# Patient Record
Sex: MALE | Race: WHITE | NOT HISPANIC OR LATINO | Employment: OTHER | URBAN - METROPOLITAN AREA
[De-identification: names, ages, dates, MRNs, and addresses within clinical notes are randomized per-mention and may not be internally consistent; named-entity substitution may affect disease eponyms.]

---

## 2024-04-24 ENCOUNTER — HOSPITAL ENCOUNTER (OUTPATIENT)
Dept: RADIOLOGY | Facility: EXTERNAL LOCATION | Age: 29
Discharge: HOME | End: 2024-04-24
Payer: COMMERCIAL

## 2024-04-24 NOTE — PROGRESS NOTES
It was a pleasure to see Mr. Matamoros at the Neurosurgery Spine Clinic at [facility].     He is a really nice 28 y.o. male  who presents to us with complaints LOW BACK PAIN radiating to RIGHT LEG that started about  3  years  ago, and have been gradually worsening since that time.  The symptoms started after no known injury    The pain is 9 /10. The pain is described as aching, burning, and stabbing and occurs all day.  Symptoms are exacerbated by standing and walking . Factors which relieve the pain include  lying down       Numbness and/or tingling - YES    Weakness : YES    Bladder/Bowel symptoms - NO    The patient has tried medications (eg: NSAIDS : Yes  PT : Yes    Date: last was 1.5 months ago  Pain Management with ESIs/selective nerve blocks  - YES    he is a NON-SMOKER and NON-DIABETIC    History of Depression : NO    PRIOR SPINE SURGERY: NO    Denies use of Aspirin, Coumadin, or Plavix or any other anticoagulants     NARCOTICS for pain : NO    Part of this patient’s history is from personal review of the patient’s previous charts.      No past medical history on file.      No current outpatient medications on file.      Review of Systems :   Constitutional: No fever or chills  Respiratory: No shortness of breath or wheezing  Musculoskeletal: see HPI above   Neurologic: See HPI above      EXAM:   There were no vitals filed for this visit.  NEURO: Neurologically patient is awake alert and oriented X 3    No obvious cranial nerve deficit.  Strength is almost 5/5 throughout all motor groups tested with no asymmetric significant motor deficit.  Deep tendon reflexes are symmetric throughout.   Gait : Antalgic.  There is obvious evidence of left-sided tilt while standing and walking.  Sensory examination is intact to touch and painful stimuli.      IMAGING:   MRI of the lumbar spine done in July 2022 and February 2024 was reviewed personally and shows evidence of left-sided scoliosis secondary to  segmentation anomaly involving L2 vertebral body with varying degree of lateral recess stenosis especially on the right side and central canal stenosis but no evidence of any high-grade compression.  AP and lateral x-rays of the lumbar spine done in thousand 22 were also reviewed and shows evidence of left-sided scoliosis secondary to segmentation anomaly involving the lumbar vertebra measuring about 30 degree decrease from L1-L5.  There is clear evidence of pelvic obliquity with the left side lower than the right measuring about 1 cm or so.    ASSESSMENT AND PLAN:  Ildefonso Matamoros is a 28 y.o. male who was diagnosed with congenital scoliosis right from birth and has noted over the past 2 or 3 years a significant pain involving the low back right lower extremity especially involving the thigh region and left-sided in the paraspinal region.  He has tried physical therapy and pain management in the past with no significant relief whatsoever.  He also feels that he walks with a left-sided tilt and has leg length discrepancy for which she veers a shoe lift on the left measuring about 7 mm or so.  I discussed with the patient and his family who was with him today the options of treatment at this point would consist of continued nonoperative treatment versus surgery if x-rays that I ordered today would show any further worsening or even otherwise given the fact that he has been significantly symptomatic.  Discussed with him the pros and cons of surgery that would consist of major thoracolumbar instrumentation and fusion for correction of the scoliosis and decompression of the nerve roots.  At this point I have ordered EOS films with leg lift on the left to evaluate his scoliosis along with a CT scan of the lumbar spine to evaluate the exact bony anatomy given presence of vertebral segmentation defect and I will get in touch with after they are completed to see if he would benefit from any kind of surgical  intervention or we can manage these with continued nonoperative treatment.    It was a pleasure to participate in Mr. Matamoros clinical care. All questions were answered to him satisfaction and he explained understanding of the further treatment plan.     Robert Cevallos MD, [facility]   of Neurological Surgery  [facility]  Attending Surgeon  Director - Minimally Invasive Spine Surgery  [place], [place]      ---Some of this note was completed using Dragon voice recognition technology and sometimes the software misinterprets words. This may include unintended errors with respect to translation of words, typographical errors or grammar errors which may not have been identified prior to finalization of the chart note. Please take this into account when reading this note---      Robert Cevallos MD, [facility]   of Neurological Surgery  [facility]  Attending Surgeon  Director - Minimally Invasive Spine Surgery  [place], [place]      Some of this note was completed using Dragon voice recognition technology and sometimes the software misinterprets words. This may include unintended errors with respect to translation of words, typographical errors or grammar errors which may not have been identified prior to finalization of the chart note. Please take this into account when reading this note

## 2024-05-02 ENCOUNTER — OFFICE VISIT (OUTPATIENT)
Dept: NEUROSURGERY | Facility: CLINIC | Age: 29
End: 2024-05-02
Payer: COMMERCIAL

## 2024-05-02 VITALS
DIASTOLIC BLOOD PRESSURE: 84 MMHG | WEIGHT: 250 LBS | SYSTOLIC BLOOD PRESSURE: 165 MMHG | HEIGHT: 71 IN | RESPIRATION RATE: 20 BRPM | HEART RATE: 112 BPM | BODY MASS INDEX: 35 KG/M2

## 2024-05-02 DIAGNOSIS — Q67.5 SCOLIOSIS, CONGENITAL: ICD-10-CM

## 2024-05-02 DIAGNOSIS — M54.16 LUMBAR RADICULOPATHY, CHRONIC: Primary | ICD-10-CM

## 2024-05-02 PROCEDURE — 1036F TOBACCO NON-USER: CPT | Performed by: NEUROLOGICAL SURGERY

## 2024-05-02 PROCEDURE — 99204 OFFICE O/P NEW MOD 45 MIN: CPT | Performed by: NEUROLOGICAL SURGERY

## 2024-05-02 PROCEDURE — 99214 OFFICE O/P EST MOD 30 MIN: CPT | Performed by: NEUROLOGICAL SURGERY

## 2024-05-02 ASSESSMENT — PAIN SCALES - GENERAL: PAINLEVEL: 6

## 2024-05-28 ENCOUNTER — HOSPITAL ENCOUNTER (OUTPATIENT)
Dept: RADIOLOGY | Facility: HOSPITAL | Age: 29
Discharge: HOME | End: 2024-05-28
Payer: COMMERCIAL

## 2024-05-28 DIAGNOSIS — Q67.5 SCOLIOSIS, CONGENITAL: ICD-10-CM

## 2024-05-28 PROCEDURE — 72131 CT LUMBAR SPINE W/O DYE: CPT | Performed by: RADIOLOGY

## 2024-05-28 PROCEDURE — 72131 CT LUMBAR SPINE W/O DYE: CPT

## 2024-05-29 ENCOUNTER — HOSPITAL ENCOUNTER (OUTPATIENT)
Dept: RADIOLOGY | Facility: HOSPITAL | Age: 29
Discharge: HOME | End: 2024-05-29
Payer: COMMERCIAL

## 2024-05-29 DIAGNOSIS — Q67.5 SCOLIOSIS, CONGENITAL: ICD-10-CM

## 2024-05-29 PROCEDURE — 72082 X-RAY EXAM ENTIRE SPI 2/3 VW: CPT

## 2024-05-29 PROCEDURE — 72082 X-RAY EXAM ENTIRE SPI 2/3 VW: CPT | Performed by: RADIOLOGY

## 2024-06-03 NOTE — PROGRESS NOTES
It was a pleasure to see Mr. Matamoros at the Neurosurgery Spine Clinic at [facility]. He is a really nice 28 y.o. male  who presents to us with complaints LOW BACK PAIN radiating to RIGHT LEG that started about  3  years  ago, and have been gradually worsening since that time.  Last visit I asked him to hae new imaging.  He has completed those imaging and is seeing me today to discuss further treatment options.  This is televisit with both audio and video appointment.  The patient mentions that he continues to have significant back pain with some lower extremity pain mostly mainly along the thighs.  He denies any bowel symptoms or any new weakness.    Constitutional: No fever or chills  Respiratory: No shortness of breath or wheezing  Musculoskeletal: see HPI above   Neurologic: See HPI above    NEURO: Neurologically patient is awake alert and oriented X 3    No obvious cranial nerve deficit.  Patient is able to move both upper and lower extremities without any focal deficit.  Essentially strength seems to be within normal limits.      EOS scoliosis films were reviewed personally and shows evidence of a well aligned spine globally but there is evidence of left-sided coronal deformity involving the lumbar spine secondary to L3 vertebral body segmentation defect with about 41 degrees of Saez angle measured from L1-L4.  CT scan shows evidence of her scoliosis to the left with segmentation defect involving the L3 vertebral body.  MRI of the lumbar spine done in July 2022 and February 2024 was reviewed personally and shows evidence of left-sided scoliosis secondary to segmentation anomaly involving L2 vertebral body with varying degree of lateral recess stenosis especially on the right side and central canal stenosis but no evidence of any high-grade compression     PLAN:    Mr. Matamoros is a really nice 28 y.o. 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  His major symptoms remains back pain but he does have some element of lower extremity pain suggestive of radiculopathy.  I discussed all the imagings with him and explained to him that I do not think he would qualify for any kind of focal decompressive surgery given the presence of significant scoliosis and at this point his option would be either agreed to continued nonoperative treatment with physical therapy and pain management versus consider definitive surgery which will consist of a major thoracolumbar instrumented fusion given the degree of scoliosis.  Discussed briefly with him the pros and cons of surgical treatment and at this point of time shared decision was made to consider nonoperative treatment.  I given a referral for physical therapy and pain management.  If he fails to improve with that or if he wants to proceed with surgery he can call my office and I will be more than happy to consider that.  At this point of time we will get her scoliosis treatment 1 year from now to see if there is any progression of the scoliosis in which case also he would qualify for surgical treatment.    It was a pleasure to participate in Mr. Matamoros clinical care. All questions were answered to him satisfaction and he explained understanding of the further treatment plan.     Robert Cevallos MD, [facility]   of Neurological Surgery  [facility] [employer]  Attending Surgeon  Director - Minimally Invasive Spine Surgery  [place], [place]      ---Some of this note was completed using Dragon voice recognition technology and sometimes the software misinterprets words. This may include unintended errors with respect to translation of words, typographical errors or grammar errors which may not have been identified prior to  finalization of the chart note. Please take this into account when reading this note---

## 2024-06-06 ENCOUNTER — TELEMEDICINE (OUTPATIENT)
Dept: NEUROSURGERY | Facility: CLINIC | Age: 29
End: 2024-06-06
Payer: COMMERCIAL

## 2024-06-06 DIAGNOSIS — M54.16 LUMBAR RADICULOPATHY, CHRONIC: ICD-10-CM

## 2024-06-06 DIAGNOSIS — Q67.5 SCOLIOSIS, CONGENITAL: ICD-10-CM

## 2024-06-06 PROCEDURE — 99214 OFFICE O/P EST MOD 30 MIN: CPT | Performed by: NEUROLOGICAL SURGERY

## 2024-06-06 PROCEDURE — 99214 OFFICE O/P EST MOD 30 MIN: CPT | Mod: 95 | Performed by: NEUROLOGICAL SURGERY

## 2024-06-10 ENCOUNTER — APPOINTMENT (OUTPATIENT)
Dept: PAIN MEDICINE | Facility: CLINIC | Age: 29
End: 2024-06-10
Payer: COMMERCIAL

## 2024-06-10 ENCOUNTER — TELEPHONE (OUTPATIENT)
Dept: PAIN MEDICINE | Facility: CLINIC | Age: 29
End: 2024-06-10
Payer: COMMERCIAL

## 2024-06-19 ENCOUNTER — OFFICE VISIT (OUTPATIENT)
Dept: PAIN MEDICINE | Facility: CLINIC | Age: 29
End: 2024-06-19
Payer: COMMERCIAL

## 2024-06-19 ENCOUNTER — TELEPHONE (OUTPATIENT)
Dept: PAIN MEDICINE | Facility: CLINIC | Age: 29
End: 2024-06-19
Payer: COMMERCIAL

## 2024-06-19 VITALS
SYSTOLIC BLOOD PRESSURE: 147 MMHG | WEIGHT: 248 LBS | BODY MASS INDEX: 34.59 KG/M2 | RESPIRATION RATE: 16 BRPM | HEART RATE: 109 BPM | DIASTOLIC BLOOD PRESSURE: 88 MMHG

## 2024-06-19 DIAGNOSIS — M47.816 LUMBAR SPONDYLOSIS: ICD-10-CM

## 2024-06-19 DIAGNOSIS — M48.062 NEUROGENIC CLAUDICATION DUE TO LUMBAR SPINAL STENOSIS: Primary | ICD-10-CM

## 2024-06-19 DIAGNOSIS — M79.18 MYOFASCIAL PAIN: ICD-10-CM

## 2024-06-19 PROCEDURE — 99213 OFFICE O/P EST LOW 20 MIN: CPT | Performed by: STUDENT IN AN ORGANIZED HEALTH CARE EDUCATION/TRAINING PROGRAM

## 2024-06-19 RX ORDER — BACLOFEN 10 MG/1
10 TABLET ORAL 3 TIMES DAILY
Qty: 90 TABLET | Refills: 0 | Status: SHIPPED | OUTPATIENT
Start: 2024-06-19 | End: 2024-07-19

## 2024-06-19 RX ORDER — SODIUM CHLORIDE, SODIUM LACTATE, POTASSIUM CHLORIDE, CALCIUM CHLORIDE 600; 310; 30; 20 MG/100ML; MG/100ML; MG/100ML; MG/100ML
20 INJECTION, SOLUTION INTRAVENOUS CONTINUOUS
OUTPATIENT
Start: 2024-06-19

## 2024-06-19 RX ORDER — GABAPENTIN 300 MG/1
600 CAPSULE ORAL 3 TIMES DAILY
Qty: 180 CAPSULE | Refills: 0 | Status: SHIPPED | OUTPATIENT
Start: 2024-06-19 | End: 2024-07-19

## 2024-06-19 ASSESSMENT — COLUMBIA-SUICIDE SEVERITY RATING SCALE - C-SSRS
6. HAVE YOU EVER DONE ANYTHING, STARTED TO DO ANYTHING, OR PREPARED TO DO ANYTHING TO END YOUR LIFE?: NO
2. HAVE YOU ACTUALLY HAD ANY THOUGHTS OF KILLING YOURSELF?: NO
1. IN THE PAST MONTH, HAVE YOU WISHED YOU WERE DEAD OR WISHED YOU COULD GO TO SLEEP AND NOT WAKE UP?: NO

## 2024-06-19 ASSESSMENT — PATIENT HEALTH QUESTIONNAIRE - PHQ9
SUM OF ALL RESPONSES TO PHQ9 QUESTIONS 1 AND 2: 2
10. IF YOU CHECKED OFF ANY PROBLEMS, HOW DIFFICULT HAVE THESE PROBLEMS MADE IT FOR YOU TO DO YOUR WORK, TAKE CARE OF THINGS AT HOME, OR GET ALONG WITH OTHER PEOPLE: NOT DIFFICULT AT ALL
1. LITTLE INTEREST OR PLEASURE IN DOING THINGS: SEVERAL DAYS
2. FEELING DOWN, DEPRESSED OR HOPELESS: SEVERAL DAYS

## 2024-06-19 NOTE — PROGRESS NOTES
Subjective   Patient ID: Ildefonso Matamoros is a 28 y.o. male who presents for Back Pain (NPV here for evaluation of bilat lower back pain that radiates into bilat hips and legs rt side goes down to his foot and lt side stops at his knee rates pain score 5/10 now and 9/10 at worst with any activity. The pain started a year ago became worse 10/2023 he saw [facility] Dr. Rinku Sy had DUARTE, TPI lumbar, oral steroids, currently on Meloxicam, doing PT at [facility] 2x week , chiropractor 2x a week and had no relief in pain. ) FELISA score   30%, SOAPP score  1.  Scre[place] N/A for age, depression positive local resource list provided and msg sent to PCP and smoking negative     HPI  Patient has a history of lumbar stenosis with neurogenic claudication as well as lumbar spondyloarthropathy and lumbar scoliosis.  He has seen a spine surgeon in the past and discussed surgical options but would like to try conservative therapies from pain management before proceeding with surgery due to his age as well as extensive nature of the possible surgeries planned.  We did discuss his lumbar MRI and discussed that he has multilevel degenerative changes as well as elements of central canal and foraminal stenosis throughout.  He also has prominent epidural lipomatosis confounding his symptoms.  He rates his pain as a 0/10 when he is laying down but can be a 9/10 particularly with any standing or walking.  Standing or walking exacerbates his back pain as well as bilateral lower extremity symptoms where they feel like they are number falling asleep and tired/heavy.  He has done physical therapy as well as chiropractic care without relief, oral steroids as well as trigger point injections and singular epidural was done without relief.  He has not tried any membrane stabilizing medications or neuropathic pain medications per his report.  Review of Systems   All other systems reviewed and are  negative.      Objective   Physical Exam  Constitutional: No acute distress, well appearing and well nourished. Patient appears stated age.  Eyes: Conjunctiva non-icteric and eye lids are without obvious rash or drooping. Pupils are symmetric.  Ears, Nose, Mouth, and Throat: External ears and nose appear to be without deformity or rash. No lesions or masses noted.  Hearing is grossly intact.  Neck: No JVD noted, tracheal position is midline.  Head and Face: Examination of the head and face revealed no abnormalities.  Respiratory: No gasping or shortness of breath noted, no use of accessory muscles noted.  Cardiovascular: Examination for edema is normal.   GI: Abdomen nontender to palpation.  Skin: No rashes or open lesions/ulcers identified on examined areas.    MSK:   No asymmetry or masses noted of the musculature.   Examination of the muscles/joints/bones show grossly normal range of motion unless noted below.    Neurologic:  Motor strength:   5/5 muscle strength of the upper extremities bilateral and equal.  5/5 muscle strength of the lower extremities bilaterally and equal.     Sensation:   Sensation intact to light touch in the bilateral upper and lower extremities.    Provocative tests: Negative Jai sign bilaterally, seated straight leg raise does not reproduce radicular symptoms bilaterally.    Psychiatric: Mood and affect are normal.      Assessment/Plan   Diagnoses and all orders for this visit:  Neurogenic claudication due to lumbar spinal stenosis  -     gabapentin (Neurontin) 300 mg capsule; Take 2 capsules (600 mg) by mouth 3 times a day.  -     baclofen (Lioresal) 10 mg tablet; Take 1 tablet (10 mg) by mouth 3 times a day.  -     Epidural Steroid Injection; Future  Lumbar spondylosis  -     gabapentin (Neurontin) 300 mg capsule; Take 2 capsules (600 mg) by mouth 3 times a day.  -     baclofen (Lioresal) 10 mg tablet; Take 1 tablet (10 mg) by mouth 3 times a day.  -     Epidural Steroid Injection;  Future  Myofascial pain  -     gabapentin (Neurontin) 300 mg capsule; Take 2 capsules (600 mg) by mouth 3 times a day.  -     baclofen (Lioresal) 10 mg tablet; Take 1 tablet (10 mg) by mouth 3 times a day.  -     Epidural Steroid Injection; Future  Other orders  -     NPO Diet Except: Sips with meds; Effective now; Standing  -     Height and weight; Standing  -     Insert and maintain peripheral IV; Standing  -     Saline lock IV; Standing  -     Type And Screen; Standing  -     lactated Ringer's infusion  -     Adult diet Regular; Standing  -     Vital Signs; Standing  -     Notify physician - Standard Parameters; Standing  -     Continue IV fluids ordered pre-procedure; Standing  -     Prior to Discharge O2 Weaning; Standing  -     Pulse oximetry, continuous; Standing  -     Discharge patient; Standing    The patient´s history, physical exam and personal review of imaging indicate diagnoses of the above items.    Plan description:  -We lengthy discussion about his current symptoms we discussed majority of his symptoms are coming from lumbar stenosis with neurogenic claudication we will plan to perform lumbar epidural for this  -We also discussed that he has a component of lumbar spondyloarthropathy and lumbar medial branch blocks to target the L4/5 and L5/S1 facet joints may be reasonable to consider in the future if his pain becomes more axial in nature only.  -Will start him on gabapentin as well as baclofen  -Will follow-up 1 month postinjection or sooner if any issues arise      Counseling:  The patient was counseled regarding diagnostic results, instructions for management, risk factor reductions, prognosis, patient and family education, impressions, risk and benefits of treatment options, as well as adherence to current treatment regimen. The importance of physical therapy/core strengthening was discussed in regard to an appropriate level for the patient to participate in currently, as well as progress as  able. Nicotine and alcohol use were reviewed and discussed as appropriate in relation to cessation and abstinence as indiciated, time spent for smoking cessation counseling when appropriate is 3-10 minutes. Appropriate use of opioid medications if prescribed as well as adherance and compliance to routine screening and avoidance of any medication that causes side effects in combination such as benzodiazepines. OARRS reviewed when indicated and naloxone offered if opioid medication prescribed.    All questions and concerns were addressed during clinical visit. Patient verbalized understanding and agreement with the current plan and counselling. The patient was invited to contact us back anytime with any questions or concerns and follow-up with us in the future.  Narrative & Impression   MRN: [id number]  Patient Name: KEKE LEE     STUDY:  MRI L-SPINE WO;  7/20/2022 7:32 am     INDICATION:  PAIN.     COMPARISON:  Radiograph 05/19/2022.     ACCESSION NUMBER(S):  [id number]     ORDERING CLINICIAN:  GEN MICHAEL     TECHNIQUE:  Sagittal T1, T2, STIR, axial T1 and T2 weighted images of the lumbar  spine were acquired. Coronal T1 and coronal T2 weighted images were  also performed.     FINDINGS:  For counting purposes the last fully segmented vertebral body is  labeled L5.     There is moderate levoconvexity of the lumbar spine centered at L2.  There is a left lateral calos vertebra at the L2 level.     There is mild right lateral wedging of the L1 and L3 vertebral bodies.     Alignment vertebral body heights and marrow signal pattern otherwise  within normal limits. Mild edema along the right lateral endplates of  T12 and L1. There is desiccated disc signal at T12-L1, L1-L2 L2-L3  and L3-L4. Narrowing of the right sided disc spaces at these levels.     Paraspinal soft tissues are unremarkable.     Evaluation by level:     T12-L1: No severe spinal canal or neural foraminal stenosis.     T12-L1: Right eccentric disc  bulge and facet arthrosis.  Mild-to-moderate spinal canal stenosis, narrowing of the right  subarticular recess mild right and no significant left neural  foraminal stenosis.     L1-L2: Disc bulge and facet arthrosis. Moderate spinal canal  stenosis, narrowing of the subarticular recess, mild to moderate  right and no significant left neural foraminal stenosis.     L2-L3: Disc bulge, facet arthrosis and prominent epidural fat results  in severe spinal canal stenosis. No significant neural foraminal  stenosis.     L4-L5: Disc bulge and facet arthrosis. No significant spinal canal  stenosis. Mild left and no significant right neural foraminal  stenosis.     L5-S1: No significant spinal canal or neural foraminal stenose     IMPRESSION:  There is moderate levoconvexity of the lumbar spine centered at L2.  There is a left lateral calos vertebra at the L2 level.     Levoconvexity and degenerative changes of the lumbar spine most  pronounced at L2-L3 with severe narrowing of the spinal canal. Please  see above for further details.          Lidia Rivera RN 06/19/24 9:26 AM

## 2024-06-19 NOTE — H&P (VIEW-ONLY)
Subjective   Patient ID: Ildefonso Matamoros is a 28 y.o. male who presents for Back Pain (NPV here for evaluation of bilat lower back pain that radiates into bilat hips and legs rt side goes down to his foot and lt side stops at his knee rates pain score 5/10 now and 9/10 at worst with any activity. The pain started a year ago became worse 10/2023 he saw [facility] Dr. Rinku Sy had DURATE, TPI lumbar, oral steroids, currently on Meloxicam, doing PT at [facility] 2x week , chiropractor 2x a week and had no relief in pain. ) FELISA score   30%, SOAPP score  1.  Screening [place] N/A for age, depression positive local resource list provided and msg sent to PCP and smoking negative     HPI  Patient has a history of lumbar stenosis with neurogenic claudication as well as lumbar spondyloarthropathy and lumbar scoliosis.  He has seen a spine surgeon in the past and discussed surgical options but would like to try conservative therapies from pain management before proceeding with surgery due to his age as well as extensive nature of the possible surgeries planned.  We did discuss his lumbar MRI and discussed that he has multilevel degenerative changes as well as elements of central canal and foraminal stenosis throughout.  He also has prominent epidural lipomatosis confounding his symptoms.  He rates his pain as a 0/10 when he is laying down but can be a 9/10 particularly with any standing or walking.  Standing or walking exacerbates his back pain as well as bilateral lower extremity symptoms where they feel like they are number falling asleep and tired/heavy.  He has done physical therapy as well as chiropractic care without relief, oral steroids as well as trigger point injections and singular epidural was done without relief.  He has not tried any membrane stabilizing medications or neuropathic pain medications per his report.  Review of Systems   All other systems reviewed and are  negative.      Objective   Physical Exam  Constitutional: No acute distress, well appearing and well nourished. Patient appears stated age.  Eyes: Conjunctiva non-icteric and eye lids are without obvious rash or drooping. Pupils are symmetric.  Ears, Nose, Mouth, and Throat: External ears and nose appear to be without deformity or rash. No lesions or masses noted.  Hearing is grossly intact.  Neck: No JVD noted, tracheal position is midline.  Head and Face: Examination of the head and face revealed no abnormalities.  Respiratory: No gasping or shortness of breath noted, no use of accessory muscles noted.  Cardiovascular: Examination for edema is normal.   GI: Abdomen nontender to palpation.  Skin: No rashes or open lesions/ulcers identified on examined areas.    MSK:   No asymmetry or masses noted of the musculature.   Examination of the muscles/joints/bones show grossly normal range of motion unless noted below.    Neurologic:  Motor strength:   5/5 muscle strength of the upper extremities bilateral and equal.  5/5 muscle strength of the lower extremities bilaterally and equal.     Sensation:   Sensation intact to light touch in the bilateral upper and lower extremities.    Provocative tests: Negative Jai sign bilaterally, seated straight leg raise does not reproduce radicular symptoms bilaterally.    Psychiatric: Mood and affect are normal.      Assessment/Plan   Diagnoses and all orders for this visit:  Neurogenic claudication due to lumbar spinal stenosis  -     gabapentin (Neurontin) 300 mg capsule; Take 2 capsules (600 mg) by mouth 3 times a day.  -     baclofen (Lioresal) 10 mg tablet; Take 1 tablet (10 mg) by mouth 3 times a day.  -     Epidural Steroid Injection; Future  Lumbar spondylosis  -     gabapentin (Neurontin) 300 mg capsule; Take 2 capsules (600 mg) by mouth 3 times a day.  -     baclofen (Lioresal) 10 mg tablet; Take 1 tablet (10 mg) by mouth 3 times a day.  -     Epidural Steroid Injection;  Future  Myofascial pain  -     gabapentin (Neurontin) 300 mg capsule; Take 2 capsules (600 mg) by mouth 3 times a day.  -     baclofen (Lioresal) 10 mg tablet; Take 1 tablet (10 mg) by mouth 3 times a day.  -     Epidural Steroid Injection; Future  Other orders  -     NPO Diet Except: Sips with meds; Effective now; Standing  -     Height and weight; Standing  -     Insert and maintain peripheral IV; Standing  -     Saline lock IV; Standing  -     Type And Screen; Standing  -     lactated Ringer's infusion  -     Adult diet Regular; Standing  -     Vital Signs; Standing  -     Notify physician - Standard Parameters; Standing  -     Continue IV fluids ordered pre-procedure; Standing  -     Prior to Discharge O2 Weaning; Standing  -     Pulse oximetry, continuous; Standing  -     Discharge patient; Standing    The patient´s history, physical exam and personal review of imaging indicate diagnoses of the above items.    Plan description:  -We lengthy discussion about his current symptoms we discussed majority of his symptoms are coming from lumbar stenosis with neurogenic claudication we will plan to perform lumbar epidural for this  -We also discussed that he has a component of lumbar spondyloarthropathy and lumbar medial branch blocks to target the L4/5 and L5/S1 facet joints may be reasonable to consider in the future if his pain becomes more axial in nature only.  -Will start him on gabapentin as well as baclofen  -Will follow-up 1 month postinjection or sooner if any issues arise      Counseling:  The patient was counseled regarding diagnostic results, instructions for management, risk factor reductions, prognosis, patient and family education, impressions, risk and benefits of treatment options, as well as adherence to current treatment regimen. The importance of physical therapy/core strengthening was discussed in regard to an appropriate level for the patient to participate in currently, as well as progress as  able. Nicotine and alcohol use were reviewed and discussed as appropriate in relation to cessation and abstinence as indiciated, time spent for smoking cessation counseling when appropriate is 3-10 minutes. Appropriate use of opioid medications if prescribed as well as adherance and compliance to routine screening and avoidance of any medication that causes side effects in combination such as benzodiazepines. OARRS reviewed when indicated and naloxone offered if opioid medication prescribed.    All questions and concerns were addressed during clinical visit. Patient verbalized understanding and agreement with the current plan and counselling. The patient was invited to contact us back anytime with any questions or concerns and follow-up with us in the future.  Narrative & Impression   MRN: [id number]  Patient Name: KEKE LEE     STUDY:  MRI L-SPINE WO;  7/20/2022 7:32 am     INDICATION:  PAIN.     COMPARISON:  Radiograph 05/19/2022.     ACCESSION NUMBER(S):  [id number]     ORDERING CLINICIAN:  GEN MICHAEL     TECHNIQUE:  Sagittal T1, T2, STIR, axial T1 and T2 weighted images of the lumbar  spine were acquired. Coronal T1 and coronal T2 weighted images were  also performed.     FINDINGS:  For counting purposes the last fully segmented vertebral body is  labeled L5.     There is moderate levoconvexity of the lumbar spine centered at L2.  There is a left lateral calos vertebra at the L2 level.     There is mild right lateral wedging of the L1 and L3 vertebral bodies.     Alignment vertebral body heights and marrow signal pattern otherwise  within normal limits. Mild edema along the right lateral endplates of  T12 and L1. There is desiccated disc signal at T12-L1, L1-L2 L2-L3  and L3-L4. Narrowing of the right sided disc spaces at these levels.     Paraspinal soft tissues are unremarkable.     Evaluation by level:     T12-L1: No severe spinal canal or neural foraminal stenosis.     T12-L1: Right eccentric disc  bulge and facet arthrosis.  Mild-to-moderate spinal canal stenosis, narrowing of the right  subarticular recess mild right and no significant left neural  foraminal stenosis.     L1-L2: Disc bulge and facet arthrosis. Moderate spinal canal  stenosis, narrowing of the subarticular recess, mild to moderate  right and no significant left neural foraminal stenosis.     L2-L3: Disc bulge, facet arthrosis and prominent epidural fat results  in severe spinal canal stenosis. No significant neural foraminal  stenosis.     L4-L5: Disc bulge and facet arthrosis. No significant spinal canal  stenosis. Mild left and no significant right neural foraminal  stenosis.     L5-S1: No significant spinal canal or neural foraminal stenose     IMPRESSION:  There is moderate levoconvexity of the lumbar spine centered at L2.  There is a left lateral calos vertebra at the L2 level.     Levoconvexity and degenerative changes of the lumbar spine most  pronounced at L2-L3 with severe narrowing of the spinal canal. Please  see above for further details.          Lidia Rivera RN 06/19/24 9:26 AM

## 2024-06-19 NOTE — TELEPHONE ENCOUNTER
Ildefonso was in our office for his Pain Management Referral and  answered yes to being depressed, he does not have thoughts of suicide or a plan, he is not on anything for depression and is interested in seeing someone for counseling but has not called anyone. I gave him a Counseling resource list of local  all local agencies. I informed him I am sending you a message.      Dr. Donald CIFUENTES  Fax number [phone]

## 2024-06-21 ENCOUNTER — TELEPHONE (OUTPATIENT)
Dept: PAIN MEDICINE | Facility: CLINIC | Age: 29
End: 2024-06-21
Payer: COMMERCIAL

## 2024-07-19 ENCOUNTER — HOSPITAL ENCOUNTER (OUTPATIENT)
Dept: OPERATING ROOM | Facility: HOSPITAL | Age: 29
Setting detail: OUTPATIENT SURGERY
Discharge: HOME | End: 2024-07-19
Payer: COMMERCIAL

## 2024-07-19 VITALS
TEMPERATURE: 98.5 F | HEART RATE: 65 BPM | WEIGHT: 245 LBS | DIASTOLIC BLOOD PRESSURE: 77 MMHG | HEIGHT: 71 IN | BODY MASS INDEX: 34.3 KG/M2 | SYSTOLIC BLOOD PRESSURE: 118 MMHG | RESPIRATION RATE: 16 BRPM | OXYGEN SATURATION: 97 %

## 2024-07-19 DIAGNOSIS — M47.816 LUMBAR SPONDYLOSIS: ICD-10-CM

## 2024-07-19 DIAGNOSIS — M48.062 NEUROGENIC CLAUDICATION DUE TO LUMBAR SPINAL STENOSIS: ICD-10-CM

## 2024-07-19 DIAGNOSIS — M79.18 MYOFASCIAL PAIN: ICD-10-CM

## 2024-07-19 PROCEDURE — 2500000005 HC RX 250 GENERAL PHARMACY W/O HCPCS: Performed by: STUDENT IN AN ORGANIZED HEALTH CARE EDUCATION/TRAINING PROGRAM

## 2024-07-19 PROCEDURE — 2500000004 HC RX 250 GENERAL PHARMACY W/ HCPCS (ALT 636 FOR OP/ED): Performed by: STUDENT IN AN ORGANIZED HEALTH CARE EDUCATION/TRAINING PROGRAM

## 2024-07-19 PROCEDURE — 62323 NJX INTERLAMINAR LMBR/SAC: CPT | Performed by: STUDENT IN AN ORGANIZED HEALTH CARE EDUCATION/TRAINING PROGRAM

## 2024-07-19 PROCEDURE — 2550000001 HC RX 255 CONTRASTS: Performed by: STUDENT IN AN ORGANIZED HEALTH CARE EDUCATION/TRAINING PROGRAM

## 2024-07-19 RX ORDER — METHYLPREDNISOLONE ACETATE 40 MG/ML
40 INJECTION, SUSPENSION INTRA-ARTICULAR; INTRALESIONAL; INTRAMUSCULAR; SOFT TISSUE ONCE
Status: DISCONTINUED | OUTPATIENT
Start: 2024-07-19 | End: 2024-07-20 | Stop reason: HOSPADM

## 2024-07-19 RX ORDER — SODIUM CHLORIDE 9 MG/ML
INJECTION, SOLUTION INTRAMUSCULAR; INTRAVENOUS; SUBCUTANEOUS AS NEEDED
Status: COMPLETED | OUTPATIENT
Start: 2024-07-19 | End: 2024-07-19

## 2024-07-19 RX ORDER — SODIUM CHLORIDE 9 MG/ML
4 INJECTION, SOLUTION INTRAMUSCULAR; INTRAVENOUS; SUBCUTANEOUS ONCE
Status: DISCONTINUED | OUTPATIENT
Start: 2024-07-19 | End: 2024-07-20 | Stop reason: HOSPADM

## 2024-07-19 RX ORDER — LIDOCAINE HYDROCHLORIDE 20 MG/ML
6 INJECTION, SOLUTION EPIDURAL; INFILTRATION; INTRACAUDAL; PERINEURAL ONCE
Status: COMPLETED | OUTPATIENT
Start: 2024-07-19 | End: 2024-07-19

## 2024-07-19 RX ORDER — METHYLPREDNISOLONE ACETATE 40 MG/ML
INJECTION, SUSPENSION INTRA-ARTICULAR; INTRALESIONAL; INTRAMUSCULAR; SOFT TISSUE AS NEEDED
Status: COMPLETED | OUTPATIENT
Start: 2024-07-19 | End: 2024-07-19

## 2024-07-19 ASSESSMENT — COLUMBIA-SUICIDE SEVERITY RATING SCALE - C-SSRS
2. HAVE YOU ACTUALLY HAD ANY THOUGHTS OF KILLING YOURSELF?: NO
6. HAVE YOU EVER DONE ANYTHING, STARTED TO DO ANYTHING, OR PREPARED TO DO ANYTHING TO END YOUR LIFE?: NO
1. IN THE PAST MONTH, HAVE YOU WISHED YOU WERE DEAD OR WISHED YOU COULD GO TO SLEEP AND NOT WAKE UP?: NO

## 2024-07-19 ASSESSMENT — PAIN DESCRIPTION - DESCRIPTORS: DESCRIPTORS: ACHING

## 2024-07-19 ASSESSMENT — ENCOUNTER SYMPTOMS
LOSS OF SENSATION IN FEET: 0
DEPRESSION: 0
OCCASIONAL FEELINGS OF UNSTEADINESS: 0

## 2024-07-19 ASSESSMENT — PAIN - FUNCTIONAL ASSESSMENT
PAIN_FUNCTIONAL_ASSESSMENT: 0-10
PAIN_FUNCTIONAL_ASSESSMENT: 0-10

## 2024-07-19 ASSESSMENT — PAIN SCALES - GENERAL
PAINLEVEL_OUTOF10: 3
PAINLEVEL_OUTOF10: 0 - NO PAIN

## 2024-07-19 NOTE — Clinical Note
Patient transferred self to OR bed. Pillow placed under abdomen. Patient prepped with chloraprep per MD. Patient transported to post recovery care and report given to post recovery rn. Side rails up and bed wheels locked.

## 2024-07-19 NOTE — PERIOPERATIVE NURSING NOTE
Discharge instructions reviewed by Stephania DAMICO RN no questions and verbalized understanding. pt  discharged amb to exit steady gait,  to be driven home by family sully well

## 2024-07-19 NOTE — Clinical Note
Worker's Compensation Follow-Up Office Visit  Date of Visit: 12/1/2023  Employer: [employer] [place] ( [employer])  Date of Injury: No linked episodes    CC:   Chief Complaint   Patient presents with   • Workers Compensation Follow Up Visit     Left shoulder       HPI:   Carmen Carreno is a 35 year old female, who presents with a complaint of an injury/illness that reportedly occurred during work activities.  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     Mechanism of Injury:  Patient states that the initial date of injury was on 8/22/2023.  At that time, patient states that she was at work. Patient says sustained injury to L shoulder.   This happened when assisting a patient with a gait belt when the patient started to fall; in supporting the patient from falling, she fellt a \"pull\" in the L shoulder. She felt an immediate pain and tingling into her L shoulder with tingling down to the hand. Also had a \"clicking\" or \"popping\" sensation in shoulder with movement; ROM is painful. Shortly after the incident, L elbow was also painful. Later in the evening, she developed L neck and wrist pain. No swelling or bruising. No numbness. Pain level originally 8/10.     Pt evaluated in  on 8/22/23. Xray of L shoulder obtained, negative for acute findings. Pt discharged to home with conservative care instructions.      Pt arrives to clinic reporting good improvement in all symptoms; feels 80% improved overall. Elbow pain is fully resolved. She is no longer experiencing any radiating pain from the shoulder. Shoulder, neck and wrist pain all improved; pain level at worst with movements is 4/10, at rest pain is 0/10. Pt has been doing PT exercises to treat her pain/stiffness.      She denies any other precipitating event or activity.  She has no history of similar problems unless otherwise mentioned above.  (The above mechanism of injury and injury history was copied from previous  Dressing applied to insertion site. note 9/01/23)        At initial visit 9/1/23, given good improvement reported from time of injury, pt instructed to continue with conservative care and exercises she had already implemented. She was returned to work without restrictions.      At follow up visit 9/22/23, pt reported feeling 90% improved overall but pain at times could still reach 4-6/10 intermittently. Pt was referred to PT.     At follow up visit 10/27/23, pt reported feeling 95% improved but still having twinges of pain with intermittent muscle spasming. She was to continue with PT and supportive care. She was continued on full duty.       Today, patient says that there is complete improvement in symptoms of shoulder pain and muscle spasming. Pt reports she even had to catch her daughter unexpectedly the other day using the L arm and the arm was even pulled backward some in doing so; she experienced no pain. Pt feels ready for discharge.   Associated symptoms include: no swelling, numbness or tingling of the LEs  Per pt, feels 100% improved.  Pain medications/management: none needed  Has been working without restrictions without difficulty.      CURRENT MEDICATIONS:    Medications relevant to this injury include: see HPI unless otherwise listed below      PAST HISTORY: I have reviewed the patient's medications and allergies, past medical, surgical, social and family history, updating these as appropriate.  See Histories section of the EMR for a display of this information      REVIEW OF SYSTEMS:  See HPI      PHYSICAL EXAM     Visit Vitals  /62   Pulse 98   Ht 5' 5\" (1.651 m)   Wt 59 kg (130 lb)   LMP 08/16/2023 (Approximate)   BMI 21.63 kg/m²       GENERAL: Pt alert and oriented x3, appears well, non-toxic and in no acute distress. Non-diaphoretic.   SKIN: No central or peripheral cyanosis. Good color.  HEENT: normocephalic, conjunctiva clear  EXTREMITIES: no deformities; pulses +2, strong and equal; no swelling, redness, warmth or bruising  of the joints or soft tissues of the LUE; LUE diffusely non-tender, AC joint non-tender  SPINE: no deformities; no swelling, bruising or masses; non-tender to the spinous processes of the Cspine; non-tender to the paraspinous and trapezius muscles; upper and mid L trapezius soft and symmetrical L vs R, no trigger points  NEUROLOGICAL: full sensation and 5/5 shoulder strength LUE    PROCEDURE         ASSESSMENT & PLAN     DIAGNOSIS:   1. Strain of left shoulder, subsequent encounter    2. Strain of left trapezius muscle, subsequent encounter    3. Trigger point of shoulder region, left      STATUS: This injury is determined to be WORK RELATED.    RETURN TO WORK: Employee may return to work without restrictions.Employee is discharged from care. Return Date: 12/1/2023           RESTRICTIONS:  No Restrictions    TREATMENT PLAN:   Medications for this injury/condition: None   Referral/Consult: None  Diagnostic Testing: None       Instructions: None     NEXT RETURN VISIT: None needed          She is to follow-up sooner if her symptoms should get worse.     Anticipated Maximum Medical Improvement: resolved    Verónica Phillips PA-C  12/1/2023      The physician below agrees with the plan and restrictions placed on the patient by the provider above.  Natalya Leblanc MD

## 2024-08-15 ENCOUNTER — OFFICE VISIT (OUTPATIENT)
Dept: PAIN MEDICINE | Facility: CLINIC | Age: 29
End: 2024-08-15
Payer: COMMERCIAL

## 2024-08-15 VITALS
SYSTOLIC BLOOD PRESSURE: 130 MMHG | BODY MASS INDEX: 34.44 KG/M2 | DIASTOLIC BLOOD PRESSURE: 83 MMHG | HEIGHT: 71 IN | HEART RATE: 94 BPM | RESPIRATION RATE: 16 BRPM | WEIGHT: 246 LBS

## 2024-08-15 DIAGNOSIS — M47.816 LUMBAR SPONDYLOSIS: Primary | ICD-10-CM

## 2024-08-15 DIAGNOSIS — M48.062 NEUROGENIC CLAUDICATION DUE TO LUMBAR SPINAL STENOSIS: ICD-10-CM

## 2024-08-15 DIAGNOSIS — M79.18 MYOFASCIAL PAIN: ICD-10-CM

## 2024-08-15 PROCEDURE — 99214 OFFICE O/P EST MOD 30 MIN: CPT | Performed by: PHYSICIAN ASSISTANT

## 2024-08-15 RX ORDER — SODIUM CHLORIDE, SODIUM LACTATE, POTASSIUM CHLORIDE, CALCIUM CHLORIDE 600; 310; 30; 20 MG/100ML; MG/100ML; MG/100ML; MG/100ML
20 INJECTION, SOLUTION INTRAVENOUS CONTINUOUS
OUTPATIENT
Start: 2024-08-15

## 2024-08-15 RX ORDER — BUPIVACAINE HYDROCHLORIDE 5 MG/ML
3 INJECTION, SOLUTION EPIDURAL; INTRACAUDAL ONCE
OUTPATIENT
Start: 2024-08-15 | End: 2024-08-15

## 2024-08-15 RX ORDER — LIDOCAINE HYDROCHLORIDE 20 MG/ML
10 INJECTION, SOLUTION EPIDURAL; INFILTRATION; INTRACAUDAL; PERINEURAL ONCE
OUTPATIENT
Start: 2024-08-15 | End: 2024-08-15

## 2024-08-15 ASSESSMENT — ENCOUNTER SYMPTOMS
ARTHRALGIAS: 1
BACK PAIN: 1
ENDOCRINE NEGATIVE: 1
CONSTITUTIONAL NEGATIVE: 1
MYALGIAS: 1
GASTROINTESTINAL NEGATIVE: 1
CARDIOVASCULAR NEGATIVE: 1
PSYCHIATRIC NEGATIVE: 1
WEAKNESS: 1
NUMBNESS: 1
HEMATOLOGIC/LYMPHATIC NEGATIVE: 1
ALLERGIC/IMMUNOLOGIC NEGATIVE: 1
RESPIRATORY NEGATIVE: 1
EYES NEGATIVE: 1

## 2024-08-15 ASSESSMENT — PAIN SCALES - GENERAL: PAINLEVEL: 8

## 2024-08-15 NOTE — PROGRESS NOTES
Subjective   Patient ID: Ildefonso Matamoros is a 28 y.o. male who presents for Back Pain (Patient is here to follow up from a L4/5 rashad that he had on 07/19/24.  Patient states he got about 50% relief for the first couple weeks.  He states symptoms started to worsen again around 08/04/24.  It is not as severe as it was prior to the injection but he still has pain.  He states he still has lower back pain especially with bending down.  He has numbness and tingling in his bilateral legs.  He states with standing long periods he has severe pressure in his buttocks.  ).  Patient rates his pain a 3/10 now and a 7-8/10 at worst.      FELISA score 28.      Jada Yeager RN 08/15/24 8:11 AM     Patient is a 28-year-old male.  He presents today for follow-up after undergoing an L4-5 epidural steroid injection.  This has given him 50% relief.  He states that his hip and leg pain is better.  Fortune, he is still having a lot of lower back pain at this time, the lower back pain is what is most bothersome to him.    He has seen a spine surgeon in the past and discussed surgical options but would like to try conservative therapies from pain management before proceeding with surgery due to his age. He has done physical therapy as well as chiropractic care without relief, oral steroids as well as trigger point injections.  He is using baclofen and gabapentin.  This gives some relief but once again not enough.  He has back pain that is a 3-8/10.  Worse with standing, worse with bending, worse with certain maneuvers and activities.  He wonders what else he can do to try to get some relief of the lower back pain that he is experiencing.        Review of Systems   Constitutional: Negative.    HENT: Negative.     Eyes: Negative.    Respiratory: Negative.     Cardiovascular: Negative.    Gastrointestinal: Negative.    Endocrine: Negative.    Genitourinary: Negative.    Musculoskeletal:  Positive for arthralgias, back pain, gait problem  and myalgias.   Skin: Negative.    Allergic/Immunologic: Negative.    Neurological:  Positive for weakness and numbness.   Hematological: Negative.    Psychiatric/Behavioral: Negative.         Objective   Physical Exam  Vitals and nursing note reviewed.   Constitutional:       General: He is not in acute distress.     Appearance: Normal appearance. He is not ill-appearing.   HENT:      Head: Normocephalic and atraumatic.      Right Ear: External ear normal.      Left Ear: External ear normal.      Nose: Nose normal.      Mouth/Throat:      Pharynx: Oropharynx is clear.   Eyes:      Conjunctiva/sclera: Conjunctivae normal.   Cardiovascular:      Rate and Rhythm: Normal rate and regular rhythm.      Pulses: Normal pulses.   Pulmonary:      Effort: Pulmonary effort is normal.      Breath sounds: Normal breath sounds.   Musculoskeletal:         General: Normal range of motion.      Cervical back: Normal range of motion.      Comments: 5/5 lower extremity strength  Pain with compression of the bilateral lumbar facet joints  Increased lower back pain with bilateral facet loading   Skin:     General: Skin is warm and dry.   Neurological:      General: No focal deficit present.      Mental Status: He is alert and oriented to person, place, and time. Mental status is at baseline.   Psychiatric:         Mood and Affect: Mood normal.         Behavior: Behavior normal.         Thought Content: Thought content normal.         Judgment: Judgment normal.         CT lumbar spine wo IV contrast  Status: Final result     PACS Images     Show images for CT lumbar spine wo IV contrast  Signed by    Signed Time Phone Pager   Mikey Jasmine MD 5/28/2024 11:14 [phone]     Exam Information    Status Exam Begun Exam Ended   Final 5/28/2024 08:14 5/28/2024 08:39     Study Result    Narrative & Impression   Interpreted By:  Mikey Jasmine,   STUDY:  CT LUMBAR SPINE WO IV CONTRAST  5/28/2024 8:39 am       INDICATION:  Signs/Symptoms:scoliosis      COMPARISON:  None.      ACCESSION NUMBER(S):  [id number]      ORDERING CLINICIAN:  MISBAH ALVAREZ      TECHNIQUE:  Axial CT images of the lumbar spine are obtained. Axial, coronal and  sagittal reconstructions are provided for review.      FINDINGS:  There are 5 lumbar type vertebrae. The spine curves 3ï¿½ convex to the  left centered at L2-3 with mild chronic loss right-sided vertebral  body height at L2 and L3.      The spinal canal is mildly stenosed at a congenital basis with AP  dimension in its 12-14 mm.      Curvature convex to the right is present at L5-S1 with an enlarged  left transverse process articulating with the sacrum. Sacral  articular region degenerative subcortical cyst vacuum.      T11-12: No stenosis.      T12-L1: No stenosis is noted.      L1-2: The right neural foramen is severely stenosed secondary to  scoliosis and facet hypertrophy. The right lateral recess is mildly  stenosed.      L2-3: The spinal canal and lateral recesses are moderately stenosed  secondary to the scoliosis and endplate spurring with moderate right  foraminal stenosis as well. A spur projects the inferiorly from the  right L2 lamina into the foramen.      L3-4: The spinal canal and lateral recesses are severely stenosed  more so on the right secondary to the scoliosis with mild right  foraminal stenosis.      L4-5: The lateral recesses are mildly to moderately stenosed by disc  bulge and ligament thickening.      L5-S1: No significant stenosis is noted.      IMPRESSION:  Scoliosis and degenerative changes are present as noted above.      The left L5 transverse process is enlarged and has a degenerative  articulation with the sacrum.      MACRO:  None      Signed by: Mikey Jasmine 5/28/2024 11:14 AM  Dictation workstation:   [id number]     MR lumbar spine wo IV contrast  Status: Final result     PACS Images     Show images for MR lumbar spine wo IV contrast  Signed  by    Signed Time Phone Pager   Shelby Chandler MD 7/20/2022 10:00 [phone]     Exam Information    Status Exam Begun Exam Ended   Final 7/20/2022 06:50 7/20/2022 07:32     Study Result    Narrative & Impression   MRN: [id number]  Patient Name: KEKE LEE     STUDY:  MRI L-SPINE WO;  7/20/2022 7:32 am     INDICATION:  PAIN.     COMPARISON:  Radiograph 05/19/2022.     ACCESSION NUMBER(S):  [id number]     ORDERING CLINICIAN:  GEN MICHAEL     TECHNIQUE:  Sagittal T1, T2, STIR, axial T1 and T2 weighted images of the lumbar  spine were acquired. Coronal T1 and coronal T2 weighted images were  also performed.     FINDINGS:  For counting purposes the last fully segmented vertebral body is  labeled L5.     There is moderate levoconvexity of the lumbar spine centered at L2.  There is a left lateral calos vertebra at the L2 level.     There is mild right lateral wedging of the L1 and L3 vertebral bodies.     Alignment vertebral body heights and marrow signal pattern otherwise  within normal limits. Mild edema along the right lateral endplates of  T12 and L1. There is desiccated disc signal at T12-L1, L1-L2 L2-L3  and L3-L4. Narrowing of the right sided disc spaces at these levels.     Paraspinal soft tissues are unremarkable.     Evaluation by level:     T12-L1: No severe spinal canal or neural foraminal stenosis.     T12-L1: Right eccentric disc bulge and facet arthrosis.  Mild-to-moderate spinal canal stenosis, narrowing of the right  subarticular recess mild right and no significant left neural  foraminal stenosis.     L1-L2: Disc bulge and facet arthrosis. Moderate spinal canal  stenosis, narrowing of the subarticular recess, mild to moderate  right and no significant left neural foraminal stenosis.     L2-L3: Disc bulge, facet arthrosis and prominent epidural fat results  in severe spinal canal stenosis. No significant neural foraminal  stenosis.     L4-L5: Disc bulge and facet arthrosis. No  significant spinal canal  stenosis. Mild left and no significant right neural foraminal  stenosis.     L5-S1: No significant spinal canal or neural foraminal stenose     IMPRESSION:  There is moderate levoconvexity of the lumbar spine centered at L2.  There is a left lateral calso vertebra at the L2 level.     Levoconvexity and degenerative changes of the lumbar spine most  pronounced at L2-L3 with severe narrowing of the spinal canal. Please  see above for further details.     XR LUMBAR SPINE COMPLETE 4+ VIEWS  Status: Final result     Signed by    Signed Time Phone Pager   Antonia Christensen MD 5/20/2022 10:48 [phone]     Exam Information    Status Exam Begun Exam Ended   Final 5/19/2022 09:33 5/19/2022 09:51     Study Result    Narrative & Impression   MRN: [id number]  Patient Name: KEKE LEE     STUDY:  Lumbar Spine, 7 views.     INDICATION:  SEGMENTAL AND SOMATIC DYSFUNCTION OF LUMBAR REGION.     COMPARISON:  None.     ACCESSION NUMBER(S):  [id number]     ORDERING CLINICIAN:  GEN MICHAEL     FINDINGS:  There is moderate degree levoscoliosis centered in the mid lumbar  region.  Evaluation of the vertebral body heights is limited due to degree of  scoliosis.  There are moderate appearing discogenic degenerative changes at L2-3  with disc height loss and osteophytes. There are mild discogenic  degenerative changes at L1-2. Mild facet arthropathy noted at L4-5  and L5-S1.  No spondylolysis or dynamic instability.  Apparent anterior wedging of L3 vertebral body is likely artifactual  due to degree of scoliosis.  Vertebral body heights are preserved. Posterior elements are intact.     IMPRESSION:  1. Moderate levoscoliosis centered in the mid lumbar region.  2. Moderate spondylosis at L2-3. Mild spondylosis at L1-2.  3. Mild facet arthropathy at L4-5 and L5-S1.     Assessment/Plan   Diagnoses and all orders for this visit:  Lumbar spondylosis  -      Medial Nerve Branch Block; Future  -     FL pain  management; Future  Neurogenic claudication due to lumbar spinal stenosis  Myofascial pain  Other orders  -     NPO Diet Except: Sips with meds; Effective now; Standing  -     Height and weight; Standing  -     Insert and maintain peripheral IV; Standing  -     Saline lock IV; Standing  -     Type And Screen; Standing  -     lactated Ringer's infusion  -     Adult diet Regular; Standing  -     Vital Signs; Standing  -     Notify physician - Standard Parameters; Standing  -     Continue IV fluids ordered pre-procedure; Standing  -     Prior to Discharge O2 Weaning; Standing  -     Pulse oximetry, continuous; Standing  -     Discharge patient; Standing  -     iohexol (OMNIPaque) 300 mg iodine/mL solution 3 mL  -     lidocaine PF (Xylocaine) 20 mg/mL (2 %) injection 200 mg  -     bupivacaine PF (Marcaine) 0.5 % (5 mg/mL) injection 15 mg       Patient is a 28-year-old male with a past medical history significant for the above-mentioned medical diagnoses following up today after undergoing L4-5 epidural steroid injection.  This has given him some relief.  Unfortunately, not quite enough of the lower back pain.  We once again reviewed his imaging.  On physical examination his pain appears facet mediated.  At this time, based on his imaging findings, his pain pattern, his failure to improve with conservative treatments and the significant pain he is still experiencing despite all the above-mentioned things I recommended bilateral medial branch block covering the L4-S1 facet joints.  This to be done under fluoroscopy for diagnostic purposes.  If he gets significant short-term relief he may be future candidate for RFA.  Procedure was discussed.  Risks and benefits were discussed.  Patient is agreeable.  He will follow-up 1 week after the injection for reevaluation.  He will call the clinic in the interim should he require anything from our services.  He will also continue on the baclofen and gabapentin.  OARRS reviewed.  He  will call when refills are necessary.

## 2024-08-16 ENCOUNTER — TELEPHONE (OUTPATIENT)
Dept: PAIN MEDICINE | Facility: CLINIC | Age: 29
End: 2024-08-16
Payer: COMMERCIAL

## 2024-09-05 ENCOUNTER — TELEPHONE (OUTPATIENT)
Dept: PAIN MEDICINE | Facility: CLINIC | Age: 29
End: 2024-09-05
Payer: COMMERCIAL

## 2024-09-05 DIAGNOSIS — M47.816 LUMBAR SPONDYLOSIS: ICD-10-CM

## 2024-09-05 DIAGNOSIS — M79.18 MYOFASCIAL PAIN: ICD-10-CM

## 2024-09-05 DIAGNOSIS — M48.062 NEUROGENIC CLAUDICATION DUE TO LUMBAR SPINAL STENOSIS: ICD-10-CM

## 2024-09-05 RX ORDER — GABAPENTIN 300 MG/1
600 CAPSULE ORAL 3 TIMES DAILY
Qty: 180 CAPSULE | Refills: 0 | Status: SHIPPED | OUTPATIENT
Start: 2024-09-05 | End: 2024-10-05

## 2024-09-20 ENCOUNTER — HOSPITAL ENCOUNTER (OUTPATIENT)
Dept: OPERATING ROOM | Facility: HOSPITAL | Age: 29
Setting detail: OUTPATIENT SURGERY
Discharge: HOME | End: 2024-09-20
Payer: COMMERCIAL

## 2024-09-20 VITALS
TEMPERATURE: 98.1 F | WEIGHT: 245 LBS | RESPIRATION RATE: 16 BRPM | HEART RATE: 98 BPM | OXYGEN SATURATION: 98 % | HEIGHT: 71 IN | DIASTOLIC BLOOD PRESSURE: 62 MMHG | BODY MASS INDEX: 34.3 KG/M2 | SYSTOLIC BLOOD PRESSURE: 119 MMHG

## 2024-09-20 DIAGNOSIS — M47.816 LUMBAR SPONDYLOSIS: ICD-10-CM

## 2024-09-20 PROCEDURE — 2500000005 HC RX 250 GENERAL PHARMACY W/O HCPCS: Performed by: PHYSICIAN ASSISTANT

## 2024-09-20 PROCEDURE — 2550000001 HC RX 255 CONTRASTS: Performed by: PHYSICIAN ASSISTANT

## 2024-09-20 PROCEDURE — 64493 INJ PARAVERT F JNT L/S 1 LEV: CPT | Mod: 50 | Performed by: STUDENT IN AN ORGANIZED HEALTH CARE EDUCATION/TRAINING PROGRAM

## 2024-09-20 PROCEDURE — 2500000004 HC RX 250 GENERAL PHARMACY W/ HCPCS (ALT 636 FOR OP/ED): Performed by: STUDENT IN AN ORGANIZED HEALTH CARE EDUCATION/TRAINING PROGRAM

## 2024-09-20 RX ORDER — BUPIVACAINE HYDROCHLORIDE 5 MG/ML
INJECTION, SOLUTION EPIDURAL; INTRACAUDAL AS NEEDED
Status: COMPLETED | OUTPATIENT
Start: 2024-09-20 | End: 2024-09-20

## 2024-09-20 RX ORDER — BUPIVACAINE HYDROCHLORIDE 5 MG/ML
3 INJECTION, SOLUTION EPIDURAL; INTRACAUDAL ONCE
Status: DISCONTINUED | OUTPATIENT
Start: 2024-09-20 | End: 2024-09-21 | Stop reason: HOSPADM

## 2024-09-20 RX ORDER — LIDOCAINE HYDROCHLORIDE 20 MG/ML
10 INJECTION, SOLUTION EPIDURAL; INFILTRATION; INTRACAUDAL; PERINEURAL ONCE
Status: COMPLETED | OUTPATIENT
Start: 2024-09-20 | End: 2024-09-20

## 2024-09-20 ASSESSMENT — PAIN - FUNCTIONAL ASSESSMENT
PAIN_FUNCTIONAL_ASSESSMENT: 0-10
PAIN_FUNCTIONAL_ASSESSMENT: 0-10

## 2024-09-20 ASSESSMENT — PATIENT HEALTH QUESTIONNAIRE - PHQ9
1. LITTLE INTEREST OR PLEASURE IN DOING THINGS: NOT AT ALL
2. FEELING DOWN, DEPRESSED OR HOPELESS: NOT AT ALL
SUM OF ALL RESPONSES TO PHQ9 QUESTIONS 1 AND 2: 0

## 2024-09-20 ASSESSMENT — PAIN SCALES - GENERAL
PAINLEVEL_OUTOF10: 3
PAINLEVEL_OUTOF10: 0 - NO PAIN

## 2024-09-20 NOTE — Clinical Note
Patient transported to post recovery care and report given to post recovery rn. Side rails up and bed wheels locked.

## 2024-09-20 NOTE — PERIOPERATIVE NURSING NOTE
Discharge instructions reviewed by Stephania LYONS RN no questions and verbalized understanding.   discharged amb to exit steady gait,  to be driven  home  by a friend, sully coelho

## 2024-09-20 NOTE — H&P
"History Of Present Illness  Ildefonso Matamoros is a 29 y.o. male presenting with back pain.     Past Medical History  Past Medical History:   Diagnosis Date    Back pain        Surgical History  Past Surgical History:   Procedure Laterality Date    INJECTION N/A 07/19/2024    L4-5 DUARTE    NO PAST SURGERIES          Social History  He reports that he has quit smoking. His smoking use included cigarettes. He has never used smokeless tobacco. He reports current alcohol use. He reports current drug use. Drug: Marijuana.    Family History  Family History   Problem Relation Name Age of Onset    Other (htn) Mother      Pulmonary fibrosis Father          Allergies  Patient has no known allergies.    Review of Systems   All other systems reviewed and are negative.       Physical Exam     Last Recorded Vitals  Blood pressure 142/70, pulse 83, temperature 36.7 °C (98.1 °F), temperature source Temporal, resp. rate 16, height 1.803 m (5' 11\"), weight 111 kg (245 lb), SpO2 97%.    Relevant Results        Constitutional: No acute distress, well appearing and well nourished. Patient appears stated age.  Eyes:  nonicteric sclerae   ENT: Hearing is grossly intact.  Neck: trachea midline  Head and Face: grossly normal.  Respiratory: nonlabored breathing  Cardiovascular: rate per vitals.  Neuro: alert, moving extremities.       Assessment/Plan   Assessment & Plan  Lumbar spondylosis      Lumbar mbbs       I spent   minutes in the professional and overall care of this patient.      Edilson Hancock, DO    "

## 2024-10-01 ENCOUNTER — OFFICE VISIT (OUTPATIENT)
Dept: PAIN MEDICINE | Facility: CLINIC | Age: 29
End: 2024-10-01
Payer: COMMERCIAL

## 2024-10-01 VITALS — HEART RATE: 85 BPM | SYSTOLIC BLOOD PRESSURE: 123 MMHG | RESPIRATION RATE: 16 BRPM | DIASTOLIC BLOOD PRESSURE: 77 MMHG

## 2024-10-01 DIAGNOSIS — M48.062 NEUROGENIC CLAUDICATION DUE TO LUMBAR SPINAL STENOSIS: ICD-10-CM

## 2024-10-01 DIAGNOSIS — Q67.5 SCOLIOSIS, CONGENITAL: ICD-10-CM

## 2024-10-01 DIAGNOSIS — M54.16 LUMBAR RADICULOPATHY, CHRONIC: ICD-10-CM

## 2024-10-01 DIAGNOSIS — M79.18 MYOFASCIAL PAIN: ICD-10-CM

## 2024-10-01 DIAGNOSIS — M47.816 LUMBAR SPONDYLOSIS: Primary | ICD-10-CM

## 2024-10-01 PROCEDURE — 99213 OFFICE O/P EST LOW 20 MIN: CPT

## 2024-10-01 RX ORDER — GABAPENTIN 800 MG/1
800 TABLET ORAL 3 TIMES DAILY
Qty: 90 TABLET | Refills: 2 | Status: SHIPPED | OUTPATIENT
Start: 2024-10-01 | End: 2024-12-30

## 2024-10-01 RX ORDER — BUPIVACAINE HYDROCHLORIDE 5 MG/ML
3 INJECTION, SOLUTION EPIDURAL; INTRACAUDAL ONCE
OUTPATIENT
Start: 2024-10-01 | End: 2024-10-01

## 2024-10-01 RX ORDER — SODIUM CHLORIDE, SODIUM LACTATE, POTASSIUM CHLORIDE, CALCIUM CHLORIDE 600; 310; 30; 20 MG/100ML; MG/100ML; MG/100ML; MG/100ML
20 INJECTION, SOLUTION INTRAVENOUS CONTINUOUS
OUTPATIENT
Start: 2024-10-01

## 2024-10-01 RX ORDER — LIDOCAINE HYDROCHLORIDE 20 MG/ML
10 INJECTION, SOLUTION EPIDURAL; INFILTRATION; INTRACAUDAL; PERINEURAL ONCE
OUTPATIENT
Start: 2024-10-01 | End: 2024-10-01

## 2024-10-01 ASSESSMENT — ENCOUNTER SYMPTOMS
PALPITATIONS: 0
MYALGIAS: 1
FACIAL ASYMMETRY: 0
WHEEZING: 0
ADENOPATHY: 0
COUGH: 0
ARTHRALGIAS: 0
CONSTITUTIONAL NEGATIVE: 1
BRUISES/BLEEDS EASILY: 0
EYES NEGATIVE: 1
LIGHT-HEADEDNESS: 0
DYSPHORIC MOOD: 0
WEAKNESS: 0
ENDOCRINE NEGATIVE: 1
SHORTNESS OF BREATH: 0
BACK PAIN: 1
ALLERGIC/IMMUNOLOGIC NEGATIVE: 1

## 2024-10-01 ASSESSMENT — COLUMBIA-SUICIDE SEVERITY RATING SCALE - C-SSRS
1. IN THE PAST MONTH, HAVE YOU WISHED YOU WERE DEAD OR WISHED YOU COULD GO TO SLEEP AND NOT WAKE UP?: NO
2. HAVE YOU ACTUALLY HAD ANY THOUGHTS OF KILLING YOURSELF?: NO
6. HAVE YOU EVER DONE ANYTHING, STARTED TO DO ANYTHING, OR PREPARED TO DO ANYTHING TO END YOUR LIFE?: NO

## 2024-10-01 NOTE — PROGRESS NOTES
Subjective   Patient ID: Ildefonso Matamoros is a 29 y.o. male who presents for Follow-up (FUV for Bilat L4/5-L5/S1 MBB he reports hhe had really good relief for the first 4 hours and then he has good relief for 3/1/2 days. Today he is having pain in his bilat lower back R>L, into his bilat gluteals into front of rt thigh down to the top of rt shin,skips to the rt lower leg he has numbness and tingling in bilat legs Rt leg is constant rates pain 3/10 now and 8/10 at worst with bending, standing, and walking. ) He takes Gabapentin, marijuana and baclofen  this helps to manage his pain, leaning on something, he is doing an HEP and he is very active walking.  FELISA score 28%. Alcohol screen negative   Lidia Rivera RN 10/01/24 8:01 AM     The patient is a 29-year-old male who presents today for follow-up after undergoing a bilateral L4-5 and L5-S1 medial branch block on 9/20/2024.  The patient reports he got significant relief of around 75% pain reduction lasting for 3 to 4 days.  He said during that time he was able to increase his level of functioning on a day-to-day basis for the entire day, whereas before he would usually be done by noon due to the pain.  Given the significant relief he obtained for a longer than expected period of time, he is ready to move forward with the second step.        Review of Systems   Constitutional: Negative.    HENT: Negative.     Eyes: Negative.    Respiratory:  Negative for cough, shortness of breath and wheezing.    Cardiovascular:  Negative for chest pain, palpitations and leg swelling.   Endocrine: Negative.    Genitourinary: Negative.    Musculoskeletal:  Positive for back pain and myalgias. Negative for arthralgias.   Skin: Negative.    Allergic/Immunologic: Negative.    Neurological:  Negative for facial asymmetry, weakness and light-headedness.   Hematological:  Negative for adenopathy. Does not bruise/bleed easily.   Psychiatric/Behavioral:  Negative for dysphoric mood and  suicidal ideas.        Objective   Physical Exam  Constitutional:       General: He is not in acute distress.     Appearance: Normal appearance.   HENT:      Head: Normocephalic.      Mouth/Throat:      Mouth: Mucous membranes are moist.   Eyes:      Extraocular Movements: Extraocular movements intact.   Cardiovascular:      Rate and Rhythm: Normal rate and regular rhythm.      Pulses: Normal pulses.      Heart sounds: Normal heart sounds. No murmur heard.     No friction rub. No gallop.   Pulmonary:      Effort: Pulmonary effort is normal.      Breath sounds: Normal breath sounds. No wheezing, rhonchi or rales.   Abdominal:      General: Abdomen is flat.      Palpations: Abdomen is soft.   Musculoskeletal:      Cervical back: Normal range of motion.      Right lower leg: No edema.      Left lower leg: No edema.      Comments: Ambulates without assistance  Strength 5/5 BLE  No lumbar paraspinal tenderness to palpation  Facet loading positive  SLR positive bilaterally   Lymphadenopathy:      Cervical: No cervical adenopathy.   Skin:     General: Skin is warm and dry.   Neurological:      General: No focal deficit present.      Mental Status: He is alert and oriented to person, place, and time. Mental status is at baseline.   Psychiatric:         Mood and Affect: Mood normal.         Behavior: Behavior normal.         Assessment/Plan   Diagnoses and all orders for this visit:  Lumbar spondylosis  -     FL pain management; Future  -      Medial Nerve Branch Block; Future  Scoliosis, congenital  -     Referral to Pain Medicine  Lumbar radiculopathy, chronic  -     Referral to Pain Medicine  -     gabapentin (Neurontin) 800 mg tablet; Take 1 tablet (800 mg) by mouth 3 times a day.  Neurogenic claudication due to lumbar spinal stenosis  -     gabapentin (Neurontin) 800 mg tablet; Take 1 tablet (800 mg) by mouth 3 times a day.  Myofascial pain  Other orders  -     iohexol (OMNIPaque) 300 mg iodine/mL solution 3 mL  -      lidocaine PF (Xylocaine) 20 mg/mL (2 %) injection 200 mg  -     bupivacaine PF (Marcaine) 0.5 % (5 mg/mL) injection 15 mg  -     NPO Diet Except: Sips with meds; Effective now; Standing  -     Height and weight; Standing  -     Type And Screen; Standing  -     lactated Ringer's infusion  -     Adult diet Regular; Standing  -     Vital Signs; Standing  -     Notify physician - Standard Parameters; Standing  -     Continue IV fluids ordered pre-procedure; Standing  -     Prior to Discharge O2 Weaning; Standing  -     Pulse oximetry, continuous; Standing  -     Discharge patient; Standing       The patient is a 29-year-old male with past medical history significant for the above-mentioned problems.  He currently rates his pain a 3/10, but says it does get up to an 8/10 at its worst.  The pain is primarily in his lower back, but will radiate into bilateral legs with numbness and tingling, and occasional weakness.  He is following up after his first lumbar MBB with significant relief for greater than 4 hours. At his most recent visit he was continued on gabapentin 600 mg 3 times a day and baclofen 10 mg 3 times a day as needed.  He denies side effects to these medications, and says they are helpful in decreasing the radicular symptoms into bilateral legs.  We discussed increasing the gabapentin to 800 mg 3 times a day, and patient is agreeable.  PDMP reviewed, Rx sent.  And given the significant relief he obtained from the MBB and how he was able to improve his level of functioning and better perform ADLs, we will proceed with his second bilateral medial branch block covering the L4-S1 facet joints under fluoroscopy.  No med holds for this procedure.  The procedure was discussed, risks and benefits were discussed, patient is agreeable.  He will follow-up after the injection for reevaluation, call the clinic sooner if needed.

## 2024-10-18 ENCOUNTER — HOSPITAL ENCOUNTER (OUTPATIENT)
Dept: OPERATING ROOM | Facility: HOSPITAL | Age: 29
Setting detail: OUTPATIENT SURGERY
Discharge: HOME | End: 2024-10-18
Payer: COMMERCIAL

## 2024-10-18 VITALS
DIASTOLIC BLOOD PRESSURE: 90 MMHG | OXYGEN SATURATION: 99 % | TEMPERATURE: 98.4 F | WEIGHT: 240 LBS | BODY MASS INDEX: 32.51 KG/M2 | SYSTOLIC BLOOD PRESSURE: 136 MMHG | RESPIRATION RATE: 16 BRPM | HEART RATE: 71 BPM | HEIGHT: 72 IN

## 2024-10-18 DIAGNOSIS — M47.816 LUMBAR SPONDYLOSIS: ICD-10-CM

## 2024-10-18 PROCEDURE — 2500000004 HC RX 250 GENERAL PHARMACY W/ HCPCS (ALT 636 FOR OP/ED): Performed by: STUDENT IN AN ORGANIZED HEALTH CARE EDUCATION/TRAINING PROGRAM

## 2024-10-18 PROCEDURE — 2550000001 HC RX 255 CONTRASTS

## 2024-10-18 PROCEDURE — 64493 INJ PARAVERT F JNT L/S 1 LEV: CPT | Mod: 50 | Performed by: STUDENT IN AN ORGANIZED HEALTH CARE EDUCATION/TRAINING PROGRAM

## 2024-10-18 PROCEDURE — 2500000004 HC RX 250 GENERAL PHARMACY W/ HCPCS (ALT 636 FOR OP/ED)

## 2024-10-18 RX ORDER — LIDOCAINE HYDROCHLORIDE 20 MG/ML
10 INJECTION, SOLUTION EPIDURAL; INFILTRATION; INTRACAUDAL; PERINEURAL ONCE
Status: COMPLETED | OUTPATIENT
Start: 2024-10-18 | End: 2024-10-18

## 2024-10-18 RX ORDER — BUPIVACAINE HYDROCHLORIDE 5 MG/ML
INJECTION, SOLUTION PERINEURAL AS NEEDED
Status: COMPLETED | OUTPATIENT
Start: 2024-10-18 | End: 2024-10-18

## 2024-10-18 RX ORDER — DEXTROMETHORPHAN HYDROBROMIDE, GUAIFENESIN 5; 100 MG/5ML; MG/5ML
650 LIQUID ORAL EVERY 8 HOURS PRN
COMMUNITY

## 2024-10-18 RX ORDER — ACETAMINOPHEN 650 MG/1
SUPPOSITORY RECTAL
COMMUNITY
End: 2024-10-18

## 2024-10-18 RX ORDER — BUPIVACAINE HYDROCHLORIDE 5 MG/ML
3 INJECTION, SOLUTION EPIDURAL; INTRACAUDAL ONCE
Status: DISCONTINUED | OUTPATIENT
Start: 2024-10-18 | End: 2024-10-19 | Stop reason: HOSPADM

## 2024-10-18 ASSESSMENT — PAIN - FUNCTIONAL ASSESSMENT
PAIN_FUNCTIONAL_ASSESSMENT: 0-10
PAIN_FUNCTIONAL_ASSESSMENT: 0-10

## 2024-10-18 ASSESSMENT — ENCOUNTER SYMPTOMS
DEPRESSION: 0
OCCASIONAL FEELINGS OF UNSTEADINESS: 0
LOSS OF SENSATION IN FEET: 0

## 2024-10-18 ASSESSMENT — PAIN SCALES - GENERAL
PAINLEVEL_OUTOF10: 4
PAINLEVEL_OUTOF10: 6

## 2024-10-18 NOTE — PERIOPERATIVE NURSING NOTE
Discharge instructions reviewed by   Stephania YBARRA RN no questions and verbalized understanding.   discharged amb to exit steady gait,  to be driven home by family sully well

## 2024-10-18 NOTE — Clinical Note
Patient arrived via cart. Transferred to OR table with assistance. C-Arm in use. Procedural skin site WDL.

## 2024-10-22 ENCOUNTER — TELEPHONE (OUTPATIENT)
Dept: PAIN MEDICINE | Facility: CLINIC | Age: 29
End: 2024-10-22

## 2024-10-22 ENCOUNTER — OFFICE VISIT (OUTPATIENT)
Dept: PAIN MEDICINE | Facility: CLINIC | Age: 29
End: 2024-10-22

## 2024-10-22 VITALS
BODY MASS INDEX: 32.55 KG/M2 | WEIGHT: 240 LBS | DIASTOLIC BLOOD PRESSURE: 84 MMHG | SYSTOLIC BLOOD PRESSURE: 135 MMHG | RESPIRATION RATE: 16 BRPM | HEART RATE: 82 BPM

## 2024-10-22 DIAGNOSIS — M48.062 NEUROGENIC CLAUDICATION DUE TO LUMBAR SPINAL STENOSIS: Primary | ICD-10-CM

## 2024-10-22 PROCEDURE — 99213 OFFICE O/P EST LOW 20 MIN: CPT

## 2024-10-22 RX ORDER — DEXAMETHASONE SODIUM PHOSPHATE 10 MG/ML
10 INJECTION INTRAMUSCULAR; INTRAVENOUS ONCE
OUTPATIENT
Start: 2024-10-22 | End: 2024-10-22

## 2024-10-22 RX ORDER — FLUOXETINE 10 MG/1
10 CAPSULE ORAL DAILY
COMMUNITY

## 2024-10-22 RX ORDER — PREGABALIN 200 MG/1
200 CAPSULE ORAL 2 TIMES DAILY
Qty: 60 CAPSULE | Refills: 1 | Status: SHIPPED | OUTPATIENT
Start: 2024-10-22 | End: 2024-12-21

## 2024-10-22 RX ORDER — SODIUM CHLORIDE 9 MG/ML
1 INJECTION, SOLUTION INTRAMUSCULAR; INTRAVENOUS; SUBCUTANEOUS ONCE
OUTPATIENT
Start: 2024-10-22 | End: 2024-10-22

## 2024-10-22 RX ORDER — LIDOCAINE HYDROCHLORIDE 20 MG/ML
1 INJECTION, SOLUTION EPIDURAL; INFILTRATION; INTRACAUDAL; PERINEURAL ONCE
OUTPATIENT
Start: 2024-10-22 | End: 2024-10-22

## 2024-10-22 RX ORDER — LIDOCAINE HYDROCHLORIDE 20 MG/ML
6 INJECTION, SOLUTION EPIDURAL; INFILTRATION; INTRACAUDAL; PERINEURAL ONCE
OUTPATIENT
Start: 2024-10-22 | End: 2024-10-22

## 2024-10-22 ASSESSMENT — ENCOUNTER SYMPTOMS
ADENOPATHY: 0
MYALGIAS: 1
LIGHT-HEADEDNESS: 0
ALLERGIC/IMMUNOLOGIC NEGATIVE: 1
COUGH: 0
CONSTITUTIONAL NEGATIVE: 1
WHEEZING: 0
BRUISES/BLEEDS EASILY: 0
WEAKNESS: 0
DYSPHORIC MOOD: 0
ENDOCRINE NEGATIVE: 1
ARTHRALGIAS: 0
FACIAL ASYMMETRY: 0
SHORTNESS OF BREATH: 0
PALPITATIONS: 0
EYES NEGATIVE: 1
BACK PAIN: 1

## 2024-10-22 ASSESSMENT — COLUMBIA-SUICIDE SEVERITY RATING SCALE - C-SSRS
1. IN THE PAST MONTH, HAVE YOU WISHED YOU WERE DEAD OR WISHED YOU COULD GO TO SLEEP AND NOT WAKE UP?: NO
6. HAVE YOU EVER DONE ANYTHING, STARTED TO DO ANYTHING, OR PREPARED TO DO ANYTHING TO END YOUR LIFE?: NO
2. HAVE YOU ACTUALLY HAD ANY THOUGHTS OF KILLING YOURSELF?: NO

## 2024-11-22 ENCOUNTER — HOSPITAL ENCOUNTER (OUTPATIENT)
Dept: OPERATING ROOM | Facility: HOSPITAL | Age: 29
Setting detail: OUTPATIENT SURGERY
Discharge: HOME | End: 2024-11-22
Payer: COMMERCIAL

## 2024-11-22 VITALS
WEIGHT: 240 LBS | DIASTOLIC BLOOD PRESSURE: 91 MMHG | SYSTOLIC BLOOD PRESSURE: 124 MMHG | HEIGHT: 72 IN | HEART RATE: 90 BPM | TEMPERATURE: 98.1 F | RESPIRATION RATE: 20 BRPM | OXYGEN SATURATION: 97 % | BODY MASS INDEX: 32.51 KG/M2

## 2024-11-22 DIAGNOSIS — M48.062 NEUROGENIC CLAUDICATION DUE TO LUMBAR SPINAL STENOSIS: ICD-10-CM

## 2024-11-22 PROCEDURE — 2550000001 HC RX 255 CONTRASTS

## 2024-11-22 PROCEDURE — 2500000004 HC RX 250 GENERAL PHARMACY W/ HCPCS (ALT 636 FOR OP/ED): Performed by: STUDENT IN AN ORGANIZED HEALTH CARE EDUCATION/TRAINING PROGRAM

## 2024-11-22 PROCEDURE — 64483 NJX AA&/STRD TFRM EPI L/S 1: CPT | Mod: RT | Performed by: STUDENT IN AN ORGANIZED HEALTH CARE EDUCATION/TRAINING PROGRAM

## 2024-11-22 PROCEDURE — 2500000004 HC RX 250 GENERAL PHARMACY W/ HCPCS (ALT 636 FOR OP/ED)

## 2024-11-22 RX ORDER — DEXAMETHASONE SODIUM PHOSPHATE 10 MG/ML
10 INJECTION INTRAMUSCULAR; INTRAVENOUS ONCE
Status: DISCONTINUED | OUTPATIENT
Start: 2024-11-22 | End: 2024-11-23 | Stop reason: HOSPADM

## 2024-11-22 RX ORDER — LIDOCAINE HYDROCHLORIDE 20 MG/ML
1 INJECTION, SOLUTION EPIDURAL; INFILTRATION; INTRACAUDAL; PERINEURAL ONCE
Status: DISCONTINUED | OUTPATIENT
Start: 2024-11-22 | End: 2024-11-23 | Stop reason: HOSPADM

## 2024-11-22 RX ORDER — LIDOCAINE HYDROCHLORIDE 5 MG/ML
INJECTION, SOLUTION INFILTRATION; INTRAVENOUS AS NEEDED
Status: COMPLETED | OUTPATIENT
Start: 2024-11-22 | End: 2024-11-22

## 2024-11-22 RX ORDER — DEXAMETHASONE SODIUM PHOSPHATE 10 MG/ML
INJECTION INTRAMUSCULAR; INTRAVENOUS AS NEEDED
Status: COMPLETED | OUTPATIENT
Start: 2024-11-22 | End: 2024-11-22

## 2024-11-22 RX ORDER — SODIUM CHLORIDE 9 MG/ML
1 INJECTION, SOLUTION INTRAMUSCULAR; INTRAVENOUS; SUBCUTANEOUS ONCE
Status: DISCONTINUED | OUTPATIENT
Start: 2024-11-22 | End: 2024-11-23 | Stop reason: HOSPADM

## 2024-11-22 RX ORDER — LIDOCAINE HYDROCHLORIDE 20 MG/ML
6 INJECTION, SOLUTION EPIDURAL; INFILTRATION; INTRACAUDAL; PERINEURAL ONCE
Status: COMPLETED | OUTPATIENT
Start: 2024-11-22 | End: 2024-11-22

## 2024-11-22 ASSESSMENT — PAIN SCALES - GENERAL
PAINLEVEL_OUTOF10: 2
PAINLEVEL_OUTOF10: 4

## 2024-11-22 ASSESSMENT — PAIN - FUNCTIONAL ASSESSMENT
PAIN_FUNCTIONAL_ASSESSMENT: 0-10
PAIN_FUNCTIONAL_ASSESSMENT: 0-10

## 2024-11-22 ASSESSMENT — ENCOUNTER SYMPTOMS
OCCASIONAL FEELINGS OF UNSTEADINESS: 0
LOSS OF SENSATION IN FEET: 0
DEPRESSION: 0

## 2024-11-22 NOTE — Clinical Note
Cervical positioner used for positioning with pillow under LE. Hands tucked under hips. Safety strap in use.

## 2024-11-22 NOTE — H&P
"History Of Present Illness  Ildefonso Matamoros \"Demetris\" is a 29 y.o. male presenting with back pain.     Past Medical History  Past Medical History:   Diagnosis Date    Back pain        Surgical History  Past Surgical History:   Procedure Laterality Date    INJECTION N/A 07/19/2024    L4-5 DUARTE    INJECTION Bilateral 09/20/2024    Bilat L4/5-L5/S1 MBB    INJECTION Bilateral 10/18/2024    Bilat L4/5-L5/S1 MBB    NO PAST SURGERIES          Social History  He reports that he has quit smoking. His smoking use included cigarettes. He has never used smokeless tobacco. He reports current alcohol use of about 6.0 standard drinks of alcohol per week. He reports current drug use. Frequency: 7.00 times per week. Drug: Marijuana.    Family History  Family History   Problem Relation Name Age of Onset    Other (htn) Mother      Pulmonary fibrosis Father          Allergies  Patient has no known allergies.    Review of Systems   All other systems reviewed and are negative.       Physical Exam     Last Recorded Vitals  There were no vitals taken for this visit.    Relevant Results        Constitutional: No acute distress, well appearing and well nourished. Patient appears stated age.  Eyes:  nonicteric sclerae   ENT: Hearing is grossly intact.  Neck: trachea midline  Head and Face: grossly normal.  Respiratory: nonlabored breathing  Cardiovascular: rate per vitals.  Neuro: alert, moving extremities.       Assessment/Plan   Assessment & Plan  Neurogenic claudication due to lumbar spinal stenosis      ltr       I spent   minutes in the professional and overall care of this patient.      Edilson Hancock, DO    "

## 2024-12-16 ENCOUNTER — OFFICE VISIT (OUTPATIENT)
Dept: PAIN MEDICINE | Facility: CLINIC | Age: 29
End: 2024-12-16
Payer: COMMERCIAL

## 2024-12-16 VITALS
RESPIRATION RATE: 14 BRPM | DIASTOLIC BLOOD PRESSURE: 78 MMHG | HEART RATE: 74 BPM | WEIGHT: 245 LBS | SYSTOLIC BLOOD PRESSURE: 116 MMHG | BODY MASS INDEX: 33.23 KG/M2

## 2024-12-16 DIAGNOSIS — M48.062 NEUROGENIC CLAUDICATION DUE TO LUMBAR SPINAL STENOSIS: Primary | ICD-10-CM

## 2024-12-16 PROCEDURE — 99213 OFFICE O/P EST LOW 20 MIN: CPT

## 2024-12-16 ASSESSMENT — ENCOUNTER SYMPTOMS
BACK PAIN: 1
WHEEZING: 0
PALPITATIONS: 0
COUGH: 0
ARTHRALGIAS: 0
EYES NEGATIVE: 1
FACIAL ASYMMETRY: 0
LIGHT-HEADEDNESS: 0
DYSPHORIC MOOD: 0
ADENOPATHY: 0
BRUISES/BLEEDS EASILY: 0
ALLERGIC/IMMUNOLOGIC NEGATIVE: 1
MYALGIAS: 1
CONSTITUTIONAL NEGATIVE: 1
WEAKNESS: 0
ENDOCRINE NEGATIVE: 1
SHORTNESS OF BREATH: 0

## 2024-12-16 NOTE — PROGRESS NOTES
"Subjective   Patient ID: Ildefonso Matamoros \"Demetris\" is a 29 y.o. male who presents for Follow-up ([facility] for Rt L2/3+L3/4 TFESI on 11/22/24 he reports no relief at all. Today reports having Bilat  low back pain with radiation to R leg and occasionally L buttocks and leg, numbness and tingling worse in shin area thigh is on the side and not as bad rates pain 3/10 now at worse 7/10 with standing or walking for a long time sitting needs lean to the lt side. He is taking Baclofen and Lyrica these are helping to ease his pain. ) FELISA score 22%.   Lidia Rivera RN 12/16/24 10:00 AM     The patient is a 29-year-old male who presents today for follow-up after undergoing a right sided L2-3 and L3-4 transforaminal epidural steroid injection on 11/22/2024.  The patient reports no relief from this injection.  He currently rates his pain a 3/10, says it does get up to a 7/10 at its worst.  The pain is primarily across his lower back and will radiate down the lateral/anterior aspect of his right thigh.  He gets numbness and tingling into that leg, and standing or walking for prolonged periods of time make the symptoms worse.  He gets relief from laying flat on his back.  He has previously been told that he needs to have an extensive spine surgery with fusion from L1-S1, but has been wanting to delay that as long as possible.  At his last appointment we had started him on baclofen and changed from gabapentin to Lyrica and equivalent dose.  He feels as if the Lyrica is much more effective in helping with his pain, but has noticed some blurred vision from it.  However, he says the relief he obtains from the Lyrica is worth the side effects that he has.  We discussed that we would not increase the dose any further because of the side effects, but that we can continue at the current dose as long as he is tolerating the side effects.  If they become unpleasant in the future, we can always decrease the dose to see if this helps with the " blurred vision.        Review of Systems   Constitutional: Negative.    HENT: Negative.     Eyes: Negative.    Respiratory:  Negative for cough, shortness of breath and wheezing.    Cardiovascular:  Negative for chest pain, palpitations and leg swelling.   Endocrine: Negative.    Genitourinary: Negative.    Musculoskeletal:  Positive for back pain and myalgias. Negative for arthralgias.   Skin: Negative.    Allergic/Immunologic: Negative.    Neurological:  Negative for facial asymmetry, weakness and light-headedness.   Hematological:  Negative for adenopathy. Does not bruise/bleed easily.   Psychiatric/Behavioral:  Negative for dysphoric mood and suicidal ideas.        Objective   Physical Exam  Constitutional:       General: He is not in acute distress.     Appearance: Normal appearance.   HENT:      Head: Normocephalic.      Mouth/Throat:      Mouth: Mucous membranes are moist.   Eyes:      Extraocular Movements: Extraocular movements intact.   Cardiovascular:      Rate and Rhythm: Normal rate and regular rhythm.      Pulses: Normal pulses.      Heart sounds: Normal heart sounds. No murmur heard.     No friction rub. No gallop.   Pulmonary:      Effort: Pulmonary effort is normal.      Breath sounds: Normal breath sounds. No wheezing, rhonchi or rales.   Abdominal:      General: Abdomen is flat.      Palpations: Abdomen is soft.   Musculoskeletal:      Cervical back: Normal range of motion.      Right lower leg: No edema.      Left lower leg: No edema.      Comments: Ambulates without assistance  Strength 5/5 BLE   Lymphadenopathy:      Cervical: No cervical adenopathy.   Skin:     General: Skin is warm and dry.   Neurological:      General: No focal deficit present.      Mental Status: He is alert and oriented to person, place, and time. Mental status is at baseline.   Psychiatric:         Mood and Affect: Mood normal.         Behavior: Behavior normal.         Assessment/Plan   Diagnoses and all orders for this  visit:  Neurogenic claudication due to lumbar spinal stenosis  -     Referral to Neurosurgery; Future       The patient is a 29-year-old male with a past medical history significant for the above-mentioned problem.  He is following up after lumbar TFESI with no relief.  He previously underwent 2 different MBB's with no relief.  Prior to that he underwent an L4-5 DUARTE with moderate short-lived relief.  At this time, he is not wanting to pursue any further injections, as none have brought any meaningful lasting relief.  We discussed obtaining a second opinion from another spine surgeon to see if there is any other option besides an L1 through S1 fusion.  We will place the referral going to Dr. Clifford with [facility] in [place].  We will also continue on the Lyrica at this time.  PDMP reviewed.  He will follow-up in 6 months, call the clinic sooner if needed.

## 2025-01-20 ENCOUNTER — TELEPHONE (OUTPATIENT)
Dept: PAIN MEDICINE | Facility: CLINIC | Age: 30
End: 2025-01-20
Payer: COMMERCIAL

## 2025-01-20 DIAGNOSIS — M48.062 NEUROGENIC CLAUDICATION DUE TO LUMBAR SPINAL STENOSIS: ICD-10-CM

## 2025-01-20 RX ORDER — PREGABALIN 200 MG/1
200 CAPSULE ORAL 2 TIMES DAILY
Qty: 60 CAPSULE | Refills: 2 | Status: SHIPPED | OUTPATIENT
Start: 2025-01-20 | End: 2025-04-20

## 2025-03-17 ENCOUNTER — TELEPHONE (OUTPATIENT)
Dept: PAIN MEDICINE | Facility: CLINIC | Age: 30
End: 2025-03-17
Payer: COMMERCIAL

## 2025-06-16 ENCOUNTER — OFFICE VISIT (OUTPATIENT)
Dept: PAIN MEDICINE | Facility: CLINIC | Age: 30
End: 2025-06-16
Payer: COMMERCIAL

## 2025-06-16 VITALS — RESPIRATION RATE: 16 BRPM | HEART RATE: 80 BPM | SYSTOLIC BLOOD PRESSURE: 121 MMHG | DIASTOLIC BLOOD PRESSURE: 79 MMHG

## 2025-06-16 DIAGNOSIS — M47.816 LUMBAR SPONDYLOSIS: ICD-10-CM

## 2025-06-16 DIAGNOSIS — M48.062 NEUROGENIC CLAUDICATION DUE TO LUMBAR SPINAL STENOSIS: Primary | ICD-10-CM

## 2025-06-16 PROCEDURE — 99213 OFFICE O/P EST LOW 20 MIN: CPT

## 2025-06-16 ASSESSMENT — ENCOUNTER SYMPTOMS
DYSPHORIC MOOD: 0
BACK PAIN: 1
WEAKNESS: 0
MYALGIAS: 1
FACIAL ASYMMETRY: 0
ALLERGIC/IMMUNOLOGIC NEGATIVE: 1
COUGH: 0
WHEEZING: 0
EYES NEGATIVE: 1
PALPITATIONS: 0
ENDOCRINE NEGATIVE: 1
CONSTITUTIONAL NEGATIVE: 1
ADENOPATHY: 0
SHORTNESS OF BREATH: 0
BRUISES/BLEEDS EASILY: 0
ARTHRALGIAS: 0
LIGHT-HEADEDNESS: 0

## 2025-06-16 NOTE — PROGRESS NOTES
"Subjective   Patient ID: Ildefonso Matamoros \"Demetris\" is a 29 y.o. male who presents for Follow-up ([facility] for referral he reports he is having surgery 7/11/25. Today he reports having bilat lower back pain in to rt leg most of the time and lt leg with sudden movement like coughing, sneezing rate pain 3/10 now and 8/10 with any activity is worse as the day progresses. He laying down id the only thing that helps his pain,  he has tried all OTC and Rx meds injections and nothing is helping his pain.   )  FELISA score  28%,COMM 2,  screenings: depression and  smoking negative, falls n/a for age.    Lidia Rivera RN 06/16/25 7:59 AM     The patient is a 29-year-old male who presents today for a 6-month follow-up appointment for medication management.  He currently rates his pain at 3/10, but says it does get up to an 8/10 at its worst.  The pain is across his lower back and radiating down into his right leg near constantly, and into his left leg with sudden movements.  He had previously tried injections with us as well as different medications, none of which brought any meaningful relief.  He has since seen a surgeon who recommends an extensive surgery to correct his scoliosis, fusion from T12-L5.  His surgeon said it would likely relieve his nerve pain, but that he would probably always be dealing with back pain.  He is currently taking pregabalin 200 mg twice a day.  He has noticed increased sweating, but is unsure if it is from the pregabalin or any of his other medications.  He feels as if the pregabalin helps take the edge off his pain and improves his level of functioning.  He is scheduled for surgery July 11.        Review of Systems   Constitutional: Negative.    HENT: Negative.     Eyes: Negative.    Respiratory:  Negative for cough, shortness of breath and wheezing.    Cardiovascular:  Negative for chest pain, palpitations and leg swelling.   Endocrine: Negative.    Genitourinary: Negative.    Musculoskeletal:  " Positive for back pain and myalgias. Negative for arthralgias.   Skin: Negative.    Allergic/Immunologic: Negative.    Neurological:  Negative for facial asymmetry, weakness and light-headedness.   Hematological:  Negative for adenopathy. Does not bruise/bleed easily.   Psychiatric/Behavioral:  Negative for dysphoric mood and suicidal ideas.        Objective   Physical Exam  Constitutional:       General: He is not in acute distress.     Appearance: Normal appearance.   HENT:      Head: Normocephalic.      Mouth/Throat:      Mouth: Mucous membranes are moist.   Eyes:      Extraocular Movements: Extraocular movements intact.   Cardiovascular:      Rate and Rhythm: Normal rate and regular rhythm.      Pulses: Normal pulses.      Heart sounds: Normal heart sounds. No murmur heard.     No friction rub. No gallop.   Pulmonary:      Effort: Pulmonary effort is normal.      Breath sounds: Normal breath sounds. No wheezing, rhonchi or rales.   Abdominal:      General: Abdomen is flat.      Palpations: Abdomen is soft.   Musculoskeletal:      Cervical back: Normal range of motion.      Right lower leg: No edema.      Left lower leg: No edema.      Comments: Ambulates without assistance  Strength 5/5 BLE   Lymphadenopathy:      Cervical: No cervical adenopathy.   Skin:     General: Skin is warm and dry.   Neurological:      General: No focal deficit present.      Mental Status: He is alert and oriented to person, place, and time. Mental status is at baseline.   Psychiatric:         Mood and Affect: Mood normal.         Behavior: Behavior normal.         Assessment/Plan   Diagnoses and all orders for this visit:  Neurogenic claudication due to lumbar spinal stenosis  Lumbar spondylosis       The patient is a 29-year-old male with past medical history significant for the above-mentioned problems.  We will maintain him on pregabalin 200 mg twice a day.  He does not think he needs refills at this time.  PDMP reviewed.  Uses the  baclofen very rarely.  He would like to maintain on the current regimen, as he has surgery coming up next month.  He does not have any questions or concerns about his pain at this time.  He would like to follow-up with us in 6 months, call the clinic sooner if needed.

## 2025-07-28 ENCOUNTER — TELEPHONE (OUTPATIENT)
Dept: PAIN MEDICINE | Facility: CLINIC | Age: 30
End: 2025-07-28
Payer: COMMERCIAL

## 2025-07-28 DIAGNOSIS — M48.062 NEUROGENIC CLAUDICATION DUE TO LUMBAR SPINAL STENOSIS: ICD-10-CM

## 2025-07-28 RX ORDER — PREGABALIN 200 MG/1
200 CAPSULE ORAL 2 TIMES DAILY
Qty: 60 CAPSULE | Refills: 2 | Status: SHIPPED | OUTPATIENT
Start: 2025-07-28 | End: 2025-10-26